# Patient Record
Sex: MALE | URBAN - METROPOLITAN AREA
[De-identification: names, ages, dates, MRNs, and addresses within clinical notes are randomized per-mention and may not be internally consistent; named-entity substitution may affect disease eponyms.]

---

## 2020-06-15 ENCOUNTER — NURSE TRIAGE (OUTPATIENT)
Dept: OTHER | Facility: OTHER | Age: 30
End: 2020-06-15

## 2024-05-15 NOTE — PROGRESS NOTES
Feliz Urena 1990 male MRN: [id number]      ASSESSMENT/PLAN  Problem List Items Addressed This Visit        Cardiovascular and Mediastinum    Primary hypertension    Relevant Medications    metoprolol succinate (TOPROL-XL) 50 mg 24 hr tablet    Other Relevant Orders    Comprehensive metabolic panel    Lipid panel       Other    Mixed hyperlipidemia    Relevant Medications    atorvastatin (LIPITOR) 10 mg tablet    Other Relevant Orders    Comprehensive metabolic panel    Lipid panel   Other Visit Diagnoses     Healthcare maintenance    -  Primary    Screening for HIV (human immunodeficiency virus)        Relevant Orders    HIV 1/2 AG/AB w Reflex [facility] for 2 yr old and above    Encounter for hepatitis C screening test for low risk patient        Relevant Orders    Hepatitis C antibody        Pt did mention whether he needs to be on his medications -- discussed that we could try weaning off and monitoring BP at home, rechecking lipids. Pt is going to think about it, will continue for now.     BP WNL   Update labs as above   HIV, Hep C screening ordered, pt agreeable   Vaccinations: TDap UTD, COVID completed primary + booster  Encouraged regular physical activity, varied diet, and regular dental/eye exams     No future appointments.         SUBJECTIVE  CC: Establish Care (Patient seen in office today for a new patient to establish care - patient moved to [facility].)      HPI:  Feliz Urena is a 33 y.o. male who presents to establish care. History reviewed and updated as below.       Review of Systems   Constitutional:  Negative for unexpected weight change.   HENT:  Negative for congestion, ear pain, rhinorrhea and sore throat.    Eyes:  Negative for visual disturbance.   Respiratory:  Negative for cough and shortness of breath.    Cardiovascular:  Negative for chest pain, palpitations and leg swelling.   Gastrointestinal:  Negative for abdominal pain, blood in stool, constipation and diarrhea.   Endocrine: Negative  "for polyuria.   Genitourinary:  Negative for dysuria and hematuria.   Musculoskeletal:  Positive for back pain.   Neurological:  Negative for dizziness and headaches.   Psychiatric/Behavioral:  Positive for sleep disturbance (\"I never sleep okay\" -- usually 5-7 hours).        Historical Information   The patient history was reviewed and updated as follows:    Past Medical History:   Diagnosis Date   • Folliculitis    • High cholesterol    • Hypertension      Past Surgical History:   Procedure Laterality Date   • WISDOM TOOTH EXTRACTION       Family History   Problem Relation Age of Onset   • Asthma Mother    • Hyperlipidemia Father    • Coronary artery disease Maternal Grandmother    • Pancreatic cancer Maternal Grandmother    • Dementia Maternal Grandfather    • Coronary artery disease Maternal Grandfather    • Alzheimer's disease Maternal Grandfather    • Hyperlipidemia Paternal Grandmother    • Hyperlipidemia Paternal Grandfather    • Prostate cancer Paternal Grandfather    • Hyperlipidemia Paternal Aunt       Social History   Social History     Substance and Sexual Activity   Alcohol Use Yes    Comment: 2 days per week     Social History     Substance and Sexual Activity   Drug Use Never     Social History     Tobacco Use   Smoking Status Never   Smokeless Tobacco Never       Medications:     Current Outpatient Medications:   •  atorvastatin (LIPITOR) 10 mg tablet, Take 1 tablet (10 mg total) by mouth daily, Disp: 90 tablet, Rfl: 1  •  metoprolol succinate (TOPROL-XL) 50 mg 24 hr tablet, Take 1 tablet (50 mg total) by mouth daily, Disp: 90 tablet, Rfl: 1  Allergies   Allergen Reactions   • Sulfa Antibiotics Rash       OBJECTIVE    Vitals:   Vitals:    05/16/24 1542   BP: 126/88   BP Location: Left arm   Patient Position: Sitting   Cuff Size: Large   Pulse: 74   Temp: 98.2 °F (36.8 °C)   SpO2: 98%   Weight: 77.6 kg (171 lb)   Height: 5' 10\" (1.778 m)           Physical Exam  Vitals and nursing note reviewed. "   Constitutional:       General: He is not in acute distress.     Appearance: Normal appearance.   HENT:      Head: Normocephalic and atraumatic.      Right Ear: Tympanic membrane, ear canal and external ear normal.      Left Ear: Tympanic membrane, ear canal and external ear normal.      Nose: Nose normal.      Mouth/Throat:      Mouth: Mucous membranes are moist.      Pharynx: No oropharyngeal exudate or posterior oropharyngeal erythema.   Eyes:      Conjunctiva/sclera: Conjunctivae normal.   Cardiovascular:      Rate and Rhythm: Normal rate and regular rhythm.   Pulmonary:      Effort: Pulmonary effort is normal. No respiratory distress.      Breath sounds: Normal breath sounds.   Abdominal:      General: Bowel sounds are normal. There is no distension.      Palpations: Abdomen is soft.      Tenderness: There is no abdominal tenderness.   Musculoskeletal:      Right lower leg: No edema.      Left lower leg: No edema.   Lymphadenopathy:      Cervical: No cervical adenopathy.   Skin:     General: Skin is warm and dry.   Neurological:      Mental Status: He is alert.      Comments: Grossly intact   Psychiatric:         Mood and Affect: Mood normal.                    Jailene Morales DO  [facility]   5/16/2024  4:13 PM

## 2024-05-16 ENCOUNTER — OFFICE VISIT (OUTPATIENT)
Dept: FAMILY MEDICINE CLINIC | Facility: CLINIC | Age: 34
End: 2024-05-16
Payer: COMMERCIAL

## 2024-05-16 VITALS
HEIGHT: 70 IN | DIASTOLIC BLOOD PRESSURE: 88 MMHG | TEMPERATURE: 98.2 F | WEIGHT: 171 LBS | SYSTOLIC BLOOD PRESSURE: 126 MMHG | HEART RATE: 74 BPM | OXYGEN SATURATION: 98 % | BODY MASS INDEX: 24.48 KG/M2

## 2024-05-16 DIAGNOSIS — Z00.00 HEALTHCARE MAINTENANCE: Primary | ICD-10-CM

## 2024-05-16 DIAGNOSIS — Z11.4 SCREENING FOR HIV (HUMAN IMMUNODEFICIENCY VIRUS): ICD-10-CM

## 2024-05-16 DIAGNOSIS — I10 PRIMARY HYPERTENSION: ICD-10-CM

## 2024-05-16 DIAGNOSIS — E78.2 MIXED HYPERLIPIDEMIA: ICD-10-CM

## 2024-05-16 DIAGNOSIS — Z11.59 ENCOUNTER FOR HEPATITIS C SCREENING TEST FOR LOW RISK PATIENT: ICD-10-CM

## 2024-05-16 PROBLEM — M51.369 BULGING LUMBAR DISC: Status: ACTIVE | Noted: 2024-05-16

## 2024-05-16 PROBLEM — M51.36 BULGING LUMBAR DISC: Status: ACTIVE | Noted: 2024-05-16

## 2024-05-16 PROCEDURE — 99385 PREV VISIT NEW AGE 18-39: CPT | Performed by: FAMILY MEDICINE

## 2024-05-16 RX ORDER — METOPROLOL SUCCINATE 50 MG/1
50 TABLET, EXTENDED RELEASE ORAL DAILY
Qty: 90 TABLET | Refills: 1 | Status: SHIPPED | OUTPATIENT
Start: 2024-05-16

## 2024-05-16 RX ORDER — METOPROLOL SUCCINATE 50 MG/1
50 TABLET, EXTENDED RELEASE ORAL DAILY
COMMUNITY
Start: 2024-02-10 | End: 2024-05-16 | Stop reason: SDUPTHER

## 2024-05-16 RX ORDER — ATORVASTATIN CALCIUM 10 MG/1
10 TABLET, FILM COATED ORAL DAILY
Qty: 90 TABLET | Refills: 1 | Status: SHIPPED | OUTPATIENT
Start: 2024-05-16

## 2024-05-16 RX ORDER — ATORVASTATIN CALCIUM 10 MG/1
10 TABLET, FILM COATED ORAL DAILY
COMMUNITY
Start: 2024-02-10 | End: 2024-05-16 | Stop reason: SDUPTHER

## 2024-09-03 DIAGNOSIS — E78.2 MIXED HYPERLIPIDEMIA: ICD-10-CM

## 2024-09-03 DIAGNOSIS — I10 PRIMARY HYPERTENSION: ICD-10-CM

## 2024-09-04 RX ORDER — ATORVASTATIN CALCIUM 10 MG/1
10 TABLET, FILM COATED ORAL DAILY
Qty: 30 TABLET | Refills: 0 | Status: SHIPPED | OUTPATIENT
Start: 2024-09-04

## 2024-09-04 RX ORDER — METOPROLOL SUCCINATE 50 MG/1
50 TABLET, EXTENDED RELEASE ORAL DAILY
Qty: 90 TABLET | Refills: 1 | Status: SHIPPED | OUTPATIENT
Start: 2024-09-04

## 2024-10-20 LAB
ALBUMIN SERPL-MCNC: 4.9 G/DL (ref 4.1–5.1)
ALP SERPL-CCNC: 69 IU/L (ref 44–121)
ALT SERPL-CCNC: 28 IU/L (ref 0–44)
AST SERPL-CCNC: 20 IU/L (ref 0–40)
BILIRUB SERPL-MCNC: 0.7 MG/DL (ref 0–1.2)
BUN SERPL-MCNC: 20 MG/DL (ref 6–20)
BUN/CREAT SERPL: 17 (ref 9–20)
CALCIUM SERPL-MCNC: 9.5 MG/DL (ref 8.7–10.2)
CHLORIDE SERPL-SCNC: 102 MMOL/L (ref 96–106)
CHOLEST SERPL-MCNC: 133 MG/DL (ref 100–199)
CO2 SERPL-SCNC: 22 MMOL/L (ref 20–29)
CREAT SERPL-MCNC: 1.17 MG/DL (ref 0.76–1.27)
EGFR: 84 ML/MIN/1.73
GLOBULIN SER-MCNC: 2.1 G/DL (ref 1.5–4.5)
GLUCOSE SERPL-MCNC: 96 MG/DL (ref 70–99)
HCV AB S/CO SERPL IA: NON REACTIVE
HDLC SERPL-MCNC: 36 MG/DL
HIV 1+2 AB+HIV1 P24 AG SERPL QL IA: NON REACTIVE
LDLC SERPL CALC-MCNC: 81 MG/DL (ref 0–99)
POTASSIUM SERPL-SCNC: 4.5 MMOL/L (ref 3.5–5.2)
PROT SERPL-MCNC: 7 G/DL (ref 6–8.5)
SL AMB VLDL CHOLESTEROL CALC: 16 MG/DL (ref 5–40)
SODIUM SERPL-SCNC: 139 MMOL/L (ref 134–144)
TRIGL SERPL-MCNC: 83 MG/DL (ref 0–149)

## 2024-11-12 NOTE — PROGRESS NOTES
"Ambulatory Visit  Name: Feliz Urena      : 1990      MRN: [id number]  Encounter Provider: Jailene Morales DO  Encounter Date: 2024   Encounter department: [facility]    Assessment & Plan  Insomnia, unspecified type  Reviewed sleep hygiene. Discussed further options for management including medication/referral to Sleep Med -- pt defers at this time, but encouraged to reach out if he would like to pursue further.        Other depression  Pt is not interested in assistance with these symptoms currently, as he feels he knows overall where they are coming from. Did reviewed therapy/medication, which pt denies at this time. Again, encouraged to reach out if he would like to purse these options.             History of Present Illness     HPI    Pt presents to review labs, discuss sleep     Cr 1.17/GFR 84  Lipids: Total 133, LDL 81, HDL 36, TG 83; LFTs WNL   Glucose 96  HIV, Hep C (-)     Since grad school, has had worsening sleep issues   Doesn't have trouble falling asleep, but often will fall asleep too early and then wakes up in the middle of the night. Usually only gets 5-6 hours, a good night is 7.   Notes his Dad struggles with this and takes Lunesta     Pt does also note some intermittent depression. Initially it was related to his limitations when he had disc issues. Does feel sometimes he isn't able to link his feelings to a specific trigger, though notes he is bothered by the time he lost because of his back issues.       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      Medical History Reviewed by provider this encounter:     .      Objective     /80   Pulse 68   Temp 98.8 °F (37.1 °C)   Ht 5' 10\" (1.778 m)   Wt 82.1 kg (181 lb)   SpO2 99%   BMI 25.97 kg/m²     Physical Exam  Vitals and nursing note reviewed.   Constitutional:       General: He is not in acute " distress.     Appearance: Normal appearance.   Pulmonary:      Effort: Pulmonary effort is normal. No respiratory distress.   Neurological:      Mental Status: He is alert.      Comments: Grossly intact   Psychiatric:         Mood and Affect: Mood normal.

## 2024-11-13 ENCOUNTER — OFFICE VISIT (OUTPATIENT)
Dept: FAMILY MEDICINE CLINIC | Facility: CLINIC | Age: 34
End: 2024-11-13
Payer: COMMERCIAL

## 2024-11-13 VITALS
HEIGHT: 70 IN | DIASTOLIC BLOOD PRESSURE: 80 MMHG | OXYGEN SATURATION: 99 % | WEIGHT: 181 LBS | BODY MASS INDEX: 25.91 KG/M2 | HEART RATE: 68 BPM | TEMPERATURE: 98.8 F | SYSTOLIC BLOOD PRESSURE: 126 MMHG

## 2024-11-13 DIAGNOSIS — G47.00 INSOMNIA, UNSPECIFIED TYPE: Primary | ICD-10-CM

## 2024-11-13 DIAGNOSIS — F32.89 OTHER DEPRESSION: ICD-10-CM

## 2024-11-13 PROCEDURE — 99213 OFFICE O/P EST LOW 20 MIN: CPT | Performed by: FAMILY MEDICINE

## 2024-12-22 DIAGNOSIS — E78.2 MIXED HYPERLIPIDEMIA: ICD-10-CM

## 2024-12-22 DIAGNOSIS — I10 PRIMARY HYPERTENSION: ICD-10-CM

## 2024-12-23 RX ORDER — METOPROLOL SUCCINATE 50 MG/1
50 TABLET, EXTENDED RELEASE ORAL DAILY
Qty: 90 TABLET | Refills: 1 | Status: SHIPPED | OUTPATIENT
Start: 2024-12-23

## 2024-12-23 RX ORDER — ATORVASTATIN CALCIUM 10 MG/1
10 TABLET, FILM COATED ORAL DAILY
Qty: 90 TABLET | Refills: 1 | Status: SHIPPED | OUTPATIENT
Start: 2024-12-23

## 2025-05-21 DIAGNOSIS — I10 PRIMARY HYPERTENSION: ICD-10-CM

## 2025-05-22 ENCOUNTER — TELEPHONE (OUTPATIENT)
Age: 35
End: 2025-05-22

## 2025-05-22 RX ORDER — METOPROLOL SUCCINATE 50 MG/1
50 TABLET, EXTENDED RELEASE ORAL DAILY
Qty: 90 TABLET | Refills: 1 | Status: SHIPPED | OUTPATIENT
Start: 2025-05-22

## 2025-05-22 NOTE — TELEPHONE ENCOUNTER
Patient is scheduled to see Dr. Morales on 5/27 for a follow up/skin irritation and also for a physical on 6/27.  He is asking if all can be done on 5/27 rather than having 2 separate appointments.  Please advise. Thank you.

## 2025-05-22 NOTE — TELEPHONE ENCOUNTER
5/22 ml called pt. He is coming in for sick visit on 5/27. Provider does not have opening that day to combine appts.  We can kaleb both with a different provider or different day.

## 2025-05-26 NOTE — PROGRESS NOTES
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 Did also discuss evaluation via ENT given recurrent nasal lesions -- can consider pending results   Orders:  •  MRSA culture; Future           History of Present Illness   HPI    Pt presents for annual physical     Review of Systems   Constitutional:  Negative for unexpected weight change.   HENT:  Positive for congestion, rhinorrhea and sneezing.         (+) allergies  Gets irritation in his nose intermittently -- \"little bubbles\"   Eyes:  Negative for visual disturbance.   Respiratory:  Negative for cough and shortness of breath.    Cardiovascular:  Negative for chest pain, palpitations and leg swelling.   Gastrointestinal:  Negative for abdominal pain, blood in stool, constipation and diarrhea.   Endocrine: Negative for polyuria.   Genitourinary:  Negative for dysuria and hematuria.   Neurological:  Positive for headaches (occasional with sinus symptoms). Negative for dizziness.   Psychiatric/Behavioral:  Positive for sleep disturbance.        Objective   /86   Pulse 68   Temp 99.1 °F (37.3 °C)   Ht 5' 10\" (1.778 m)   Wt 83.9 kg (185 lb)   SpO2 99%   BMI 26.54 kg/m²      Physical Exam  Vitals and nursing note reviewed.   Constitutional:       General: He is not in acute distress.     Appearance: Normal appearance. He is " well-developed.   HENT:      Head: Normocephalic and atraumatic.      Right Ear: Tympanic membrane, ear canal and external ear normal.      Left Ear: Tympanic membrane, ear canal and external ear normal.      Nose: Nose normal. No rhinorrhea.      Comments: Mild B/L nares irritation; slight crusting along septum of L side     Mouth/Throat:      Mouth: Mucous membranes are moist.      Pharynx: No oropharyngeal exudate or posterior oropharyngeal erythema.     Eyes:      Conjunctiva/sclera: Conjunctivae normal.     Neck:      Thyroid: No thyromegaly.     Cardiovascular:      Rate and Rhythm: Normal rate and regular rhythm.   Pulmonary:      Effort: Pulmonary effort is normal. No respiratory distress.      Breath sounds: Normal breath sounds.   Abdominal:      General: Bowel sounds are normal. There is no distension.      Palpations: Abdomen is soft.      Tenderness: There is no abdominal tenderness.     Musculoskeletal:      Right lower leg: No edema.      Left lower leg: No edema.   Lymphadenopathy:      Cervical: No cervical adenopathy.     Skin:     General: Skin is warm and dry.     Neurological:      Mental Status: He is alert.      Comments: Grossly intact   Psychiatric:         Mood and Affect: Mood normal.

## 2025-05-27 ENCOUNTER — OFFICE VISIT (OUTPATIENT)
Dept: FAMILY MEDICINE CLINIC | Facility: CLINIC | Age: 35
End: 2025-05-27
Payer: COMMERCIAL

## 2025-05-27 VITALS
DIASTOLIC BLOOD PRESSURE: 86 MMHG | OXYGEN SATURATION: 99 % | TEMPERATURE: 99.1 F | SYSTOLIC BLOOD PRESSURE: 142 MMHG | HEIGHT: 70 IN | HEART RATE: 68 BPM | BODY MASS INDEX: 26.48 KG/M2 | WEIGHT: 185 LBS

## 2025-05-27 DIAGNOSIS — Z00.00 HEALTHCARE MAINTENANCE: Primary | ICD-10-CM

## 2025-05-27 DIAGNOSIS — J34.89 NASAL LESION: ICD-10-CM

## 2025-05-27 PROCEDURE — 87081 CULTURE SCREEN ONLY: CPT | Performed by: FAMILY MEDICINE

## 2025-05-27 PROCEDURE — 99395 PREV VISIT EST AGE 18-39: CPT | Performed by: FAMILY MEDICINE

## 2025-05-29 ENCOUNTER — RESULTS FOLLOW-UP (OUTPATIENT)
Dept: FAMILY MEDICINE CLINIC | Facility: CLINIC | Age: 35
End: 2025-05-29

## 2025-05-29 LAB — MRSA NOSE QL CULT: NORMAL

## 2025-06-05 ENCOUNTER — PATIENT MESSAGE (OUTPATIENT)
Dept: FAMILY MEDICINE CLINIC | Facility: CLINIC | Age: 35
End: 2025-06-05

## 2025-06-06 VITALS — DIASTOLIC BLOOD PRESSURE: 80 MMHG | SYSTOLIC BLOOD PRESSURE: 110 MMHG

## 2025-06-09 ENCOUNTER — PATIENT MESSAGE (OUTPATIENT)
Dept: FAMILY MEDICINE CLINIC | Facility: CLINIC | Age: 35
End: 2025-06-09

## 2025-07-08 NOTE — PROGRESS NOTES
"Name: Feliz Urena      : 1990      MRN: [id number]  Encounter Provider: Jailene Morales DO  Encounter Date: 2025   Encounter department: [facility]  :  Assessment & Plan  Primary hypertension  BP well controlled on current regimen -- continue. Pt is going to continue intermittent monitoring as well        Mixed hyperlipidemia  Discussed he can trial every other day dosing or off and then recheck in 3 months -- pt doesn't want to change just yet, but will reach out if he would like repeat labs               History of Present Illness   HPI    Pt presents to discuss multiple concerns     BP was previously borderline -- had normal readings at dentist office, life insurance screening   Would like to try and wean off his atorvastatin     Review of Systems    Objective   /86   Pulse 65   Temp 100 °F (37.8 °C)   Ht 5' 10\" (1.778 m)   Wt 80.3 kg (177 lb)   SpO2 97%   BMI 25.40 kg/m²      Physical Exam  Vitals and nursing note reviewed.   Constitutional:       General: He is not in acute distress.     Appearance: Normal appearance.   Pulmonary:      Effort: Pulmonary effort is normal. No respiratory distress.     Neurological:      Mental Status: He is alert.      Comments: Grossly intact   Psychiatric:         Mood and Affect: Mood normal.         "

## 2025-07-09 ENCOUNTER — OFFICE VISIT (OUTPATIENT)
Dept: FAMILY MEDICINE CLINIC | Facility: CLINIC | Age: 35
End: 2025-07-09
Payer: COMMERCIAL

## 2025-07-09 VITALS
HEIGHT: 70 IN | OXYGEN SATURATION: 97 % | HEART RATE: 65 BPM | BODY MASS INDEX: 25.34 KG/M2 | DIASTOLIC BLOOD PRESSURE: 86 MMHG | WEIGHT: 177 LBS | TEMPERATURE: 100 F | SYSTOLIC BLOOD PRESSURE: 138 MMHG

## 2025-07-09 DIAGNOSIS — E78.2 MIXED HYPERLIPIDEMIA: ICD-10-CM

## 2025-07-09 DIAGNOSIS — I10 PRIMARY HYPERTENSION: Primary | ICD-10-CM

## 2025-07-09 PROCEDURE — 99213 OFFICE O/P EST LOW 20 MIN: CPT | Performed by: FAMILY MEDICINE

## 2025-07-09 NOTE — ASSESSMENT & PLAN NOTE
BP well controlled on current regimen -- continue. Pt is going to continue intermittent monitoring as well

## 2025-07-09 NOTE — ASSESSMENT & PLAN NOTE
Discussed he can trial every other day dosing or off and then recheck in 3 months -- pt doesn't want to change just yet, but will reach out if he would like repeat labs